# Patient Record
Sex: MALE | Race: WHITE | NOT HISPANIC OR LATINO | Employment: UNEMPLOYED | URBAN - METROPOLITAN AREA
[De-identification: names, ages, dates, MRNs, and addresses within clinical notes are randomized per-mention and may not be internally consistent; named-entity substitution may affect disease eponyms.]

---

## 2024-11-13 ENCOUNTER — HOSPITAL ENCOUNTER (EMERGENCY)
Facility: MEDICAL CENTER | Age: 34
End: 2024-11-13
Attending: EMERGENCY MEDICINE
Payer: MEDICARE

## 2024-11-13 VITALS
WEIGHT: 149.69 LBS | BODY MASS INDEX: 19.84 KG/M2 | HEART RATE: 108 BPM | SYSTOLIC BLOOD PRESSURE: 124 MMHG | RESPIRATION RATE: 20 BRPM | HEIGHT: 73 IN | DIASTOLIC BLOOD PRESSURE: 70 MMHG | OXYGEN SATURATION: 93 % | TEMPERATURE: 98 F

## 2024-11-13 DIAGNOSIS — Z51.89 ENCOUNTER FOR WOUND RE-CHECK: ICD-10-CM

## 2024-11-13 PROCEDURE — 99282 EMERGENCY DEPT VISIT SF MDM: CPT

## 2024-11-13 NOTE — ED PROVIDER NOTES
"ED Provider Note    Primary care provider: No primary care provider on file.  Means of arrival: private vehicle  History obtained from: patient  History limited by:  none    CHIEF COMPLAINT  Chief Complaint   Patient presents with    Wound Check     S/p 2nd toe amputation in [facility] 5/24-states \"it is opening up\"        HPI/JB Schwartz [place] is a 34 y.o. male who presents to the Emergency Department for evaluation of wound check.  Patient reports in May of this year he had a partial amputation to his right second toe for frostbite.  He states that he believes it has been healing well however he is homeless and only had wet socks and therefore would like to have a wound check to ensure that it is not infected.  He denies any fevers or chills.    EXTERNAL RECORDS REVIEWED  Patient was the emergency department in [facility] at [facility] on 8/4/2024 for open wound of toe.  Per that note he had had a partial amputation of his toe in May.  At that ER visit he was found to have some ulceration started on Bactrim for possible wound infection.    LIMITATION TO HISTORY   None      PAST MEDICAL HISTORY   None    SURGICAL HISTORY   has a past surgical history that includes amputation, toe (Right).    SOCIAL HISTORY  Social History     Tobacco Use    Smoking status: Never    Smokeless tobacco: Never   Vaping Use    Vaping status: Never Used   Substance Use Topics    Alcohol use: Not Currently    Drug use: Never      Social History     Substance and Sexual Activity   Drug Use Never       FAMILY HISTORY  History reviewed. No pertinent family history.    CURRENT MEDICATIONS  Home Medications    **Home medications have not yet been reviewed for this encounter**         ALLERGIES  Not on File    PHYSICAL EXAM  VITAL SIGNS: /70   Pulse (!) 108   Temp 36.7 °C (98 °F) (Oral)   Resp 20   Ht 1.854 m (6' 1\")   Wt 67.9 kg (149 lb 11.1 oz)   SpO2 93%   BMI 19.75 kg/m²   Vitals reviewed by " myself.  Physical Exam  Nursing note and vitals reviewed.  Constitutional: Well-developed and well-nourished. No acute distress.   HENT: Head is normocephalic and atraumatic.  Eyes: extra-ocular movements intact  Cardiovascular: tachycardic rate and regular rhythm. 2+ bilateral distal pedal pulses  Pulmonary/Chest: Normal respiratory effort   Musculoskeletal: Patient has well-healed amputation of his right second toe, wound is completely healed with scar tissue present.  There is no open wound, there is no erythema to the area  Neurological: Awake and alert  Skin: Skin is warm and dry. No rash.         DIAGNOSTIC STUDIES:  LABS  none      COURSE & MEDICAL DECISION MAKING        INITIAL ASSESSMENT, ED COURSE AND PLAN    Patient is a 34-year-old male who comes in for wound check.  Differential diagnosis includes normal postoperative healing, infected wound, cellulitis.  On exam patient is well-appearing, vitals notable for slight tachycardia, otherwise unremarkable.  He is intact pulses.  His left second toe is completely healed, there is no open wound.  There is no erythema.  There is no tenderness to palpation.  Therefore at this time he is reassured and advised that his toe appears to be healing, no indication for labs or imaging at this time.  He is advised on caring for amputation and given strict return precautions.  Patient is then discharged in stable condition.    DISPOSITION AND DISCUSSIONS  I have discussed management of the patient with the following physicians and DONNY's:  none    Discussion of management with other [facility] or appropriate source(s): none     Escalation of care considered, and ultimately not performed:Laboratory analysis and diagnostic imaging    Barriers to care at this time, including but not limited to: Patient is homeless, patient does not have established primary care provider.     Decision tools and prescription drugs considered including, but not limited to: see above.        FINAL  IMPRESSION  1. Encounter for wound re-check

## 2024-11-13 NOTE — ED NOTES
Brought back to rm 12, feet examined. He has cold wet socks/shoes/feet. He is here visiting from [facility] and is currently un-domiciled. He was provided foot care, warm/dry socks, and a abby meal of warm spaghetti with hot coffee.

## 2024-11-13 NOTE — ED TRIAGE NOTES
"Pt amb to er with c/o pain and \"open wound\" to previous right second toe amputation site secondary to frostbite in may in [facility]  "

## 2024-12-01 ENCOUNTER — HOSPITAL ENCOUNTER (EMERGENCY)
Facility: MEDICAL CENTER | Age: 34
End: 2024-12-01
Attending: EMERGENCY MEDICINE
Payer: MEDICARE

## 2024-12-01 VITALS
HEIGHT: 73 IN | BODY MASS INDEX: 21.1 KG/M2 | WEIGHT: 159.17 LBS | SYSTOLIC BLOOD PRESSURE: 125 MMHG | TEMPERATURE: 98.6 F | RESPIRATION RATE: 18 BRPM | HEART RATE: 108 BPM | OXYGEN SATURATION: 93 % | DIASTOLIC BLOOD PRESSURE: 76 MMHG

## 2024-12-01 DIAGNOSIS — T69.9XXA COLD EXPOSURE, INITIAL ENCOUNTER: ICD-10-CM

## 2024-12-01 PROCEDURE — 99281 EMR DPT VST MAYX REQ PHY/QHP: CPT

## 2024-12-01 NOTE — ED TRIAGE NOTES
"Chief Complaint   Patient presents with    Cold Exposure     Undomiciled gentleman that c/o decreased sensation below the knee. It happens worse in the morning after a long cold night. He would like to see a doctor about it. Denies other symptoms.      /76   Pulse (!) 108   Temp 37 °C (98.6 °F) (Temporal)   Resp 18   Ht 1.854 m (6' 1\")   Wt 72.2 kg (159 lb 2.8 oz)   SpO2 93%   BMI 21.00 kg/m²   "

## 2024-12-01 NOTE — ED PROVIDER NOTES
"ED Provider Note    CHIEF COMPLAINT  Chief Complaint   Patient presents with    Cold Exposure     Undomiciled gentleman that c/o decreased sensation below the knee. It happens worse in the morning after a long cold night. He would like to see a doctor about it. Denies other symptoms.            HPI/ROS  LIMITATION TO HISTORY   Select: : None  OUTSIDE HISTORIAN(S):  None    Efren Carroll is a 34 y.o. male who presents after cold exposure, states when he woke this morning his feet felt numb.  He states 3 months ago he lost part of one of his toes secondary to frostbite, second toe right foot.  Currently he states his feet feel more warm, is requesting new socks and a cup of coffee.  No other acute injury.  He denies other medical complaints at this time.  Patient states \"I just wanted them checked out\".    PAST MEDICAL HISTORY       SURGICAL HISTORY   has a past surgical history that includes amputation, toe (Right).    FAMILY HISTORY  History reviewed. No pertinent family history.    SOCIAL HISTORY  Social History     Tobacco Use    Smoking status: Never    Smokeless tobacco: Never   Vaping Use    Vaping status: Every Day    Substances: Nicotine   Substance and Sexual Activity    Alcohol use: Not Currently    Drug use: Never    Sexual activity: Not on file       CURRENT MEDICATIONS  Home Medications       Reviewed by Thaddeus Lynch R.N. (Registered Nurse) on 12/01/24 at 0814  Med List Status: Not Addressed     Medication Last Dose Status        Patient Stevie Taking any Medications                           ALLERGIES  No Known Allergies    PHYSICAL EXAM  VITAL SIGNS: /76   Pulse (!) 108   Temp 37 °C (98.6 °F) (Temporal)   Resp 18   Ht 1.854 m (6' 1\")   Wt 72.2 kg (159 lb 2.8 oz)   SpO2 93%   BMI 21.00 kg/m²    Musculoskeletal: Prior right second toe partial amputation.  No crepitance or deformity.  No arthralgia to palpation of the foot or ankle.  Skin: No open wounds, no cellulitic change.  No " evidence of acute frostbite at this time.  Skin is warm to both feet and toes.  Vascular: Normal pulses in both feet  Neurologic: Sensation intact in the lower extremities        COURSE & MEDICAL DECISION MAKING    ASSESSMENT, COURSE AND PLAN  Care Narrative: Patient presents after cold exposure last night sleeping outside, he is homeless.  At this time I find no evidence of acute damage from frostbite, both feet are warm.  There may be some degree of malingering, patient requesting food and coffee.  Patient is given reassurance, provided extra pair of socks, is discharged in good condition        DISPOSITION AND DISCUSSIONS    Escalation of care considered, and ultimately not performed:Laboratory analysis and diagnostic imaging    Barriers to care at this time, including but not limited to: Patient does not have established PCP and Patient is homeless.     Decision tools and prescription drugs considered including, but not limited to: Antibiotics were not indicated, no evidence of bacterial infection .    FINAL DIAGNOSIS  1. Cold exposure, initial encounter         Electronically signed by: Gómez Summers M.D., 12/1/2024 9:55 AM

## 2024-12-01 NOTE — ED NOTES
Reviewed discharge instructions w/ pt, verbalized understanding to information provided.  Pt provided w/ coffee and set of nonslip socks.  Pt ambulated from ED.